# Patient Record
Sex: MALE | Race: WHITE | NOT HISPANIC OR LATINO | Employment: FULL TIME | URBAN - METROPOLITAN AREA
[De-identification: names, ages, dates, MRNs, and addresses within clinical notes are randomized per-mention and may not be internally consistent; named-entity substitution may affect disease eponyms.]

---

## 2023-07-07 ENCOUNTER — OFFICE VISIT (OUTPATIENT)
Dept: PRIMARY CARE | Facility: CLINIC | Age: 58
End: 2023-07-07
Payer: COMMERCIAL

## 2023-07-07 VITALS
OXYGEN SATURATION: 97 % | WEIGHT: 226.8 LBS | DIASTOLIC BLOOD PRESSURE: 92 MMHG | HEART RATE: 104 BPM | SYSTOLIC BLOOD PRESSURE: 140 MMHG | BODY MASS INDEX: 30.72 KG/M2 | HEIGHT: 72 IN | TEMPERATURE: 97.9 F | RESPIRATION RATE: 18 BRPM

## 2023-07-07 DIAGNOSIS — Z00.00 ANNUAL PHYSICAL EXAM: ICD-10-CM

## 2023-07-07 DIAGNOSIS — E55.9 VITAMIN D DEFICIENCY: ICD-10-CM

## 2023-07-07 DIAGNOSIS — R06.09 DOE (DYSPNEA ON EXERTION): Primary | ICD-10-CM

## 2023-07-07 DIAGNOSIS — M75.01 ADHESIVE CAPSULITIS OF RIGHT SHOULDER: ICD-10-CM

## 2023-07-07 DIAGNOSIS — E78.5 HYPERLIPIDEMIA, UNSPECIFIED HYPERLIPIDEMIA TYPE: ICD-10-CM

## 2023-07-07 DIAGNOSIS — R07.2 PRECORDIAL PAIN: ICD-10-CM

## 2023-07-07 DIAGNOSIS — G43.811 OTHER MIGRAINE WITH STATUS MIGRAINOSUS, INTRACTABLE: ICD-10-CM

## 2023-07-07 DIAGNOSIS — R94.31 ABNORMAL EKG: ICD-10-CM

## 2023-07-07 DIAGNOSIS — Z12.5 SCREENING PSA (PROSTATE SPECIFIC ANTIGEN): ICD-10-CM

## 2023-07-07 PROBLEM — M54.9 MID BACK PAIN: Status: ACTIVE | Noted: 2023-07-07

## 2023-07-07 PROBLEM — M19.011 ARTHRITIS OF RIGHT SHOULDER REGION: Status: ACTIVE | Noted: 2023-07-07

## 2023-07-07 PROBLEM — M21.372 LEFT FOOT DROP: Status: ACTIVE | Noted: 2023-07-07

## 2023-07-07 PROCEDURE — 1036F TOBACCO NON-USER: CPT | Performed by: FAMILY MEDICINE

## 2023-07-07 PROCEDURE — 99396 PREV VISIT EST AGE 40-64: CPT | Performed by: FAMILY MEDICINE

## 2023-07-07 RX ORDER — ALBUTEROL SULFATE 90 UG/1
2 AEROSOL, METERED RESPIRATORY (INHALATION) EVERY 4 HOURS PRN
COMMUNITY
Start: 2023-03-31 | End: 2023-07-07 | Stop reason: ALTCHOICE

## 2023-07-07 RX ORDER — ROSUVASTATIN CALCIUM 20 MG/1
20 TABLET, COATED ORAL DAILY
Qty: 90 TABLET | Refills: 3 | Status: SHIPPED | OUTPATIENT
Start: 2023-07-07

## 2023-07-07 RX ORDER — ROSUVASTATIN CALCIUM 20 MG/1
20 TABLET, COATED ORAL DAILY
COMMUNITY
End: 2023-07-07 | Stop reason: SDUPTHER

## 2023-07-07 NOTE — PROGRESS NOTES
Subjective   Patient ID: Portillo Schwartz is a 58 y.o. male who presents for Annual Exam and Migraine.    HPI    Patient presents today for annual exam.  Denies SOB.  Eats unhealthy diet.  Exercises through physical employment.  Does not sleep well.  Denies abdominal pain, black or bloody stools.  BM normal.  Urination normal, no pain.  Last eye exam was 2 years ago.  Last dental exam was before [facility].  No new family h/o cancers or heart disease.    Also presents for migraines.  He states this is the worst one he has ever had.  He has had 2 bad ones in the last 6 months.  This past one he was out on Wednesday.  He used ice on the back of his neck yesterday.  Yesterday he had blurred vision.  He did take Aleve.  He believes he has high blood pressure and wonders if this is a factor.  He did have slight chest pain last night.  Her states the migraine was up the right back of head and behind right eye.  Last eye exam was 2 years ago.    He states that he had frozen right shoulder.  That was back in COVID approximately 2020.  He did get a Cortisone injection and it improved.  He states he is starting to get the pain again.    Reports his knee is improving.    He has been is taking THC gummies sold OTC.  Just wanted PCP aware.  Uses after work and only once daily.  Helps with pain and anxiety.    Review of Systems  Constitutional:  no chills, no fever and no night sweats.  Eyes: no blurred vision and no eyesight problems.  ENT: no hearing loss, no nasal congestion, no hoarseness and no sore throat.  Neck: no mass (es) and no swelling.  Cardiovascular: no chest pain, no intermittent leg claudication, no lower extremity edema, no palpitation and no syncope.  Respiratory: no cough, no shortness of breath during exertion, no shortness of breath at rest and no wheezing.  Gastrointestinal: no abdominal pain, no blood in stools, no constipation, no diarrhea, no melena, no nausea, no rectal pain and no vomiting.  Genitourinary: no  dysuria, no change in urinary frequency, no urinary hesitancy and no feelings of urinary urgency.  Musculoskeletal: no arthralgias, no back pain and no myalgias.  Integumentary: no new skin lesions and no rashes.  Neurological: no difficulty walking, no headache, no limb weakness, no numbness and no tingling.  Psychiatric/Behavioral: no anxiety, no depression, no anhedonia and no substance use disorders.  Endocrine: no recent weight gain and no recent weight loss.  Hematologic/Lymphatic: no tendency for easy bruising and no swollen glands    Objective   Physical Exam  Patient in for annual exam history of occasional migraine nothing really helped that is 3 days worth of severe headache recently.  He was supposed to get a stress test due to chest pain and shortness of breath with chronic tachycardia abnormal EKG never got that scheduled.  We need to get that done.  Headache is mostly resolved still has some pain and tenderness of the base of the skull.  No thyromegaly lungs are clear cardiac and abdominal exams benign physical exam today's office visit constitutional alert and oriented x3.    Head is atraumatic HEENT is within normal limits.    Neck supple no masses full range of motion.    Thyroid is normal in size no thyromegaly there is no carotid bruits.    Pulmonary exam shows clear to auscultation no respiratory distress.    Cardiovascular shows no murmur rub or gallop.  Regular rate and rhythm.    Abdominal exam soft nontender no hepatosplenomegaly or masses normal bowel sounds no rebound no guarding.    Musculoskeletal exam no joint pain no muscle pain full range of motion.    Psych exam normal mood and affect.    Dermatologic exam no skin lesions no rash no blemishes.    Neuro exam is no focal deficits.  Normal exam.    Extremities no edema normal pulses normal capillary refill.    BP (!) 140/92 (BP Location: Left arm, Patient Position: Sitting, BP Cuff Size: Adult)   Pulse 104   Temp 36.6 °C (97.9 °F)  (Temporal)   Resp 18   Ht 1.829 m (6')   Wt 103 kg (226 lb 12.8 oz)   SpO2 97%   BMI 30.76 kg/m²     Lab Results   Component Value Date    WBC 5.1 11/16/2022    HGB 16.0 11/16/2022    HCT 46.9 11/16/2022    MCV 92 11/16/2022     (L) 11/16/2022       Assessment/Plan nuclear stress test due to persistent tachycardia abnormal EKG positive family history dyspnea on exertion and chest discomfort with exertion.  Due for blood work also but does not need that done till November.  Get him approved for the stress test and schedule it as soon as possible he develops persistent pain needs to go to the ER.  Problem List Items Addressed This Visit          Cardiac and Vasculature    Hyperlipidemia       Endocrine/Metabolic    Vitamin D deficiency     Other Visit Diagnoses       Screening PSA (prostate specific antigen)        Annual physical exam        Other migraine with status migrainosus, intractable        Adhesive capsulitis of right shoulder

## 2023-07-10 ENCOUNTER — TELEPHONE (OUTPATIENT)
Dept: PRIMARY CARE | Facility: CLINIC | Age: 58
End: 2023-07-10
Payer: COMMERCIAL

## 2023-07-10 NOTE — TELEPHONE ENCOUNTER
I CALLED GREGORIO BACK  FROM [facility] FROM [facility] [place] . SHE CLARIFIED PATIENTS NUCLEAR STRESS TEST.  SHE STATED IT NEEDS TO BE A PHARMACEUTICAL STRESS TEST OF A EXERCISE STRESS TEST   PLEASE CHANGE ORDER TO REFLECT THIS   03-Oct-2022

## 2023-07-21 ENCOUNTER — OFFICE VISIT (OUTPATIENT)
Dept: PRIMARY CARE | Facility: CLINIC | Age: 58
End: 2023-07-21
Payer: COMMERCIAL

## 2023-07-21 VITALS
DIASTOLIC BLOOD PRESSURE: 100 MMHG | WEIGHT: 224.4 LBS | HEART RATE: 88 BPM | OXYGEN SATURATION: 97 % | TEMPERATURE: 97.1 F | BODY MASS INDEX: 30.4 KG/M2 | HEIGHT: 72 IN | SYSTOLIC BLOOD PRESSURE: 150 MMHG

## 2023-07-21 DIAGNOSIS — I10 PRIMARY HYPERTENSION: Primary | ICD-10-CM

## 2023-07-21 PROCEDURE — 3080F DIAST BP >= 90 MM HG: CPT | Performed by: FAMILY MEDICINE

## 2023-07-21 PROCEDURE — 99213 OFFICE O/P EST LOW 20 MIN: CPT | Performed by: FAMILY MEDICINE

## 2023-07-21 PROCEDURE — 3077F SYST BP >= 140 MM HG: CPT | Performed by: FAMILY MEDICINE

## 2023-07-21 PROCEDURE — 1036F TOBACCO NON-USER: CPT | Performed by: FAMILY MEDICINE

## 2023-07-21 RX ORDER — LISINOPRIL 10 MG/1
10 TABLET ORAL DAILY
Qty: 30 TABLET | Refills: 5 | Status: SHIPPED | OUTPATIENT
Start: 2023-07-21 | End: 2024-01-17

## 2023-07-21 NOTE — PROGRESS NOTES
Subjective   Patient ID: Portillo Schwartz is a 58 y.o. male who presents for High blood pressure .  HPI  Elevated BP  Patient had a stress test today 07/21/2023  Patient BP was 181/113  No increase in migraines   No visual disturbances   No numbness or tingling   No chest pain or SOB            Review of Systems  Constitutional:  no chills, no fever and no night sweats.  Eyes: no blurred vision and no eyesight problems.  ENT: no hearing loss, no nasal congestion, no hoarseness and no sore throat.  Neck: no mass (es) and no swelling.  Cardiovascular: no chest pain, no intermittent leg claudication, no lower extremity edema, no palpitation and no syncope.  Respiratory: no cough, no shortness of breath during exertion, no shortness of breath at rest and no wheezing.  Gastrointestinal: no abdominal pain, no blood in stools, no constipation, no diarrhea, no melena, no nausea, no rectal pain and no vomiting.  Genitourinary: no dysuria, no change in urinary frequency, no urinary hesitancy and no feelings of urinary urgency.  Musculoskeletal: no arthralgias, no back pain and no myalgias.  Integumentary: no new skin lesions and no rashes.  Neurological: no difficulty walking, no headache, no limb weakness, no numbness and no tingling.  Psychiatric/Behavioral: no anxiety, no depression, no anhedonia and no substance use disorders.  Endocrine: no recent weight gain and no recent weight loss.  Hematologic/Lymphatic: no tendency for easy bruising and no swollen glands    Objective   Physical Exam  Patient here for follow-up PET stress test nuclear exam done today blood pressure is very high has been running high was 180/100 they went through the stress test but did not want him to get on the treadmill.  He is feeling better now but his blood pressure still elevated.  We have discussed possibility of being placed on medication at the prior visits he is 1 to do that.  Lungs are clear cardiac and abdominal exams are benign.  No  peripheral edema.  BP (!) 150/100   Pulse 88   Temp 36.2 °C (97.1 °F) (Temporal)   Ht 1.829 m (6')   Wt 102 kg (224 lb 6.4 oz)   SpO2 97%   BMI 30.43 kg/m²     Lab Results   Component Value Date    WBC 5.1 11/16/2022    HGB 16.0 11/16/2022    HCT 46.9 11/16/2022    MCV 92 11/16/2022     (L) 11/16/2022       Assessment/Plan assessment hypertension plan is to start him on 10 mg of lisinopril follow-up in 3 weeks.  Recommend to get a home blood pressure monitor bring that with him along with his BP twice a day readings in 3 weeks.  If he has any other problems tolerating the new medication he will let us know.  He is aware it can occasionally cause persistent dry cough.  Problem List Items Addressed This Visit    None

## 2023-07-24 ENCOUNTER — TELEPHONE (OUTPATIENT)
Dept: PRIMARY CARE | Facility: CLINIC | Age: 58
End: 2023-07-24
Payer: COMMERCIAL

## 2023-07-27 ENCOUNTER — TELEPHONE (OUTPATIENT)
Dept: PRIMARY CARE | Facility: CLINIC | Age: 58
End: 2023-07-27
Payer: COMMERCIAL

## 2023-09-21 ENCOUNTER — OFFICE VISIT (OUTPATIENT)
Dept: PRIMARY CARE | Facility: CLINIC | Age: 58
End: 2023-09-21
Payer: COMMERCIAL

## 2023-09-21 VITALS
HEART RATE: 112 BPM | OXYGEN SATURATION: 96 % | DIASTOLIC BLOOD PRESSURE: 80 MMHG | BODY MASS INDEX: 29.84 KG/M2 | WEIGHT: 220 LBS | RESPIRATION RATE: 20 BRPM | SYSTOLIC BLOOD PRESSURE: 138 MMHG

## 2023-09-21 DIAGNOSIS — M25.561 ACUTE PAIN OF RIGHT KNEE: Primary | ICD-10-CM

## 2023-09-21 PROCEDURE — 99213 OFFICE O/P EST LOW 20 MIN: CPT | Performed by: FAMILY MEDICINE

## 2023-09-21 PROCEDURE — 1036F TOBACCO NON-USER: CPT | Performed by: FAMILY MEDICINE

## 2023-09-21 RX ORDER — HYDROCODONE BITARTRATE AND ACETAMINOPHEN 5; 325 MG/1; MG/1
1 TABLET ORAL EVERY 8 HOURS PRN
Qty: 21 TABLET | Refills: 0 | Status: SHIPPED | OUTPATIENT
Start: 2023-09-21 | End: 2023-09-28

## 2023-09-21 RX ORDER — PREDNISONE 10 MG/1
TABLET ORAL
Qty: 51 TABLET | Refills: 0 | Status: SHIPPED | OUTPATIENT
Start: 2023-09-21

## 2023-09-21 NOTE — PROGRESS NOTES
"Subjective   Patient ID: Portillo Schwartz is a 58 y.o. male who presents for Knee Pain and Results.    HPI  Here for above reasons    Pt having pain in RIGHT knee  Ongoing x5 days ago  Cause: was moving water heater upstairs and felt \"snap\" like a rubber band behind and on side of knee  Swelling around knee  Pt has to walk with leg sideways  Describes as: constant and burning  Rates pain 7 /10  Pt has tried ice and elevation    Pt would also like to discuss nuclear test done 7/21 and Cardiac stress test done on 7/31    Review of Systems  Constitutional:  no chills, no fever and no night sweats.  Eyes: no blurred vision and no eyesight problems.  ENT: no hearing loss, no nasal congestion, no hoarseness and no sore throat.  Neck: no mass (es) and no swelling.  Cardiovascular: no chest pain, no intermittent leg claudication, no lower extremity edema, no palpitation and no syncope.  Respiratory: no cough, no shortness of breath during exertion, no shortness of breath at rest and no wheezing.  Gastrointestinal: no abdominal pain, no blood in stools, no constipation, no diarrhea, no melena, no nausea, no rectal pain and no vomiting.  Genitourinary: no dysuria, no change in urinary frequency, no urinary hesitancy and no feelings of urinary urgency.  Musculoskeletal: no arthralgias, no back pain and no myalgias.  Integumentary: no new skin lesions and no rashes.  Neurological: no difficulty walking, no headache, no limb weakness, no numbness and no tingling.  Psychiatric/Behavioral: no anxiety, no depression, no anhedonia and no substance use disorders.  Endocrine: no recent weight gain and no recent weight loss.  Hematologic/Lymphatic: no tendency for easy bruising and no swollen glands    Objective   Physical Exam  Patient in with right knee pain was taking on a water heater out of his basement up steps felt a pop in his right knee some swelling pain posteriorly along the hamstring insertion there is no evidence of " hamstring tear no bruising there is some mild effusion laterally pain over the MCL.  No obvious weakness or tear.  He is limping he has to throw his leg out angle it with the toes placed laterally to minimize the discomfort.   /80   Pulse (!) 112   Resp 20   Wt 99.8 kg (220 lb)   SpO2 96%   BMI 29.84 kg/m²     Lab Results   Component Value Date    WBC 5.1 11/16/2022    HGB 16.0 11/16/2022    HCT 46.9 11/16/2022    MCV 92 11/16/2022     (L) 11/16/2022       Assessment/Plan plan burst and taper prednisone follow-up in 2 weeks may need MRI.  Patient is unable to tolerate physical therapy due to extreme pain at this point time.  Problem List Items Addressed This Visit    None

## 2024-01-08 NOTE — PROGRESS NOTES
Subjective   Patient ID: Portillo Schwartz is a 58 y.o. male who presents for Cough.  HPI  Patient presents today complaining of a cough, and hoarse voice x 4 days. This is gradually improving. Denies fever, chills, loss of taste/smell. At-home COVID test yesterday was negative. No known exposure to COVID. Has tried nothing OTC.      Has no other new problem /question.    Review of Systems  Constitutional:  no chills, no fever and no night sweats.  Eyes: no blurred vision and no eyesight problems.  ENT: no hearing loss, no nasal congestion, no hoarseness and no sore throat.  Neck: no mass (es) and no swelling.  Cardiovascular: no chest pain, no intermittent leg claudication, no lower extremity edema, no palpitation and no syncope.  Respiratory: no cough, no shortness of breath during exertion, no shortness of breath at rest and no wheezing.  Gastrointestinal: no abdominal pain, no blood in stools, no constipation, no diarrhea, no melena, no nausea, no rectal pain and no vomiting.  Genitourinary: no dysuria, no change in urinary frequency, no urinary hesitancy and no feelings of urinary urgency.  Musculoskeletal: no arthralgias, no back pain and no myalgias.  Integumentary: no new skin lesions and no rashes.  Neurological: no difficulty walking, no headache, no limb weakness, no numbness and no tingling.  Psychiatric/Behavioral: no anxiety, no depression, no anhedonia and no substance use disorders.  Endocrine: no recent weight gain and no recent weight loss.  Hematologic/Lymphatic: no tendency for easy bruising and no swollen glands    Objective   Physical Exam  Patient with sinus pressure congestion productive cough also chest congestion fever body aches negative COVID test exam frontal and maxillary sinus discomfort lungs show good air entry with some scattered rhonchi no wheeze crackles or retractions no respiratory distress cardiac and abdominal exams are benign.  /86   Pulse (!) 121   Temp 36.8 °C (98.3  °F)   Resp 16   Ht 1.829 m (6')   Wt 102 kg (225 lb 12.8 oz)   SpO2 98%   BMI 30.62 kg/m²     Lab Results   Component Value Date    WBC 5.1 11/16/2022    HGB 16.0 11/16/2022    HCT 46.9 11/16/2022    MCV 92 11/16/2022     (L) 11/16/2022       Assessment/Plan assessment sinusitis pharyngitis plan Z-Giorgio and follow-up if not improving  Problem List Items Addressed This Visit    None  Visit Diagnoses       Acute cough    -  Primary    Hoarse        Acute non-recurrent maxillary sinusitis        Relevant Medications    azithromycin (Zithromax) 250 mg tablet

## 2024-01-09 ENCOUNTER — OFFICE VISIT (OUTPATIENT)
Dept: PRIMARY CARE | Facility: CLINIC | Age: 59
End: 2024-01-09
Payer: COMMERCIAL

## 2024-01-09 VITALS
OXYGEN SATURATION: 98 % | DIASTOLIC BLOOD PRESSURE: 86 MMHG | RESPIRATION RATE: 16 BRPM | TEMPERATURE: 98.3 F | HEIGHT: 72 IN | SYSTOLIC BLOOD PRESSURE: 132 MMHG | WEIGHT: 225.8 LBS | HEART RATE: 121 BPM | BODY MASS INDEX: 30.58 KG/M2

## 2024-01-09 DIAGNOSIS — R49.0 HOARSE: ICD-10-CM

## 2024-01-09 DIAGNOSIS — J01.00 ACUTE NON-RECURRENT MAXILLARY SINUSITIS: ICD-10-CM

## 2024-01-09 DIAGNOSIS — R05.1 ACUTE COUGH: Primary | ICD-10-CM

## 2024-01-09 PROCEDURE — 99213 OFFICE O/P EST LOW 20 MIN: CPT | Performed by: FAMILY MEDICINE

## 2024-01-09 PROCEDURE — 1036F TOBACCO NON-USER: CPT | Performed by: FAMILY MEDICINE

## 2024-01-09 RX ORDER — AZITHROMYCIN 250 MG/1
TABLET, FILM COATED ORAL
Qty: 6 TABLET | Refills: 0 | Status: SHIPPED | OUTPATIENT
Start: 2024-01-09 | End: 2024-01-13

## 2024-01-09 ASSESSMENT — PATIENT HEALTH QUESTIONNAIRE - PHQ9
2. FEELING DOWN, DEPRESSED OR HOPELESS: NOT AT ALL
1. LITTLE INTEREST OR PLEASURE IN DOING THINGS: NOT AT ALL
SUM OF ALL RESPONSES TO PHQ9 QUESTIONS 1 AND 2: 0

## 2024-06-26 NOTE — PROGRESS NOTES
Subjective   Patient ID: Portillo Schwartz is a 59 y.o. male who presents for No chief complaint on file..  HPI    Patient presents in the office with complaints of a left heel wound. Patient states he was in the yard doing yard work and rolled his ankle. Patient went to take off his sock and it was bloody. Patient states it was swollen and he wrapped it. Patient was seen at [facility] and they prescribed and antibiotic and an ointment. Urgent care did xrays.  Patient states the antibiotic did help.  Denies stepping on anything that he knows of. Patient states he has green discharge coming from the wound but no odor. Ongoing X 3 weeks. Has tried using an ace wrap.     Review of Systems  Constitutional:  no chills, no fever and no night sweats.  Eyes: no blurred vision and no eyesight problems.  ENT: no hearing loss, no nasal congestion, no hoarseness and no sore throat.  Neck: no mass (es) and no swelling.  Cardiovascular: no chest pain, no intermittent leg claudication, no lower extremity edema, no palpitation and no syncope.  Respiratory: no cough, no shortness of breath during exertion, no shortness of breath at rest and no wheezing.  Gastrointestinal: no abdominal pain, no blood in stools, no constipation, no diarrhea, no melena, no nausea, no rectal pain and no vomiting.  Genitourinary: no dysuria, no change in urinary frequency, no urinary hesitancy and no feelings of urinary urgency.  Musculoskeletal: no arthralgias, no back pain and no myalgias.  Integumentary: no new skin lesions and no rashes.  Neurological: no difficulty walking, no headache, no limb weakness, no numbness and no tingling.  Psychiatric/Behavioral: no anxiety, no depression, no anhedonia and no substance use disorders.  Endocrine: no recent weight gain and no recent weight loss.  Hematologic/Lymphatic: no tendency for easy bruising and no swollen glands    Objective   Physical Exam  Patient with history of left heel wound had some  swelling in his foot and ankle after twisting it in the backyard went to work his boot barely went on he has a history of neuropathy in the lower extremity on the left side did not feel any pain got home that night took his sock off and there was blood needed rubbed the area just the lateral portion of the outer area of the heel raw.  Exam today is she is early granulation tissue forming there is still some devitalized tissue in the center's this was trimmed it did not hurt him at all.  No active bleeding it is erythematous nontender may mild cellulitis he had been on antibiotic and seem to be doing better at that point advised to stop using peroxide on it see if we can get it to dry out crust over keep it clean and dry just use soap and water when he does have to cleanse it we will put him on Cipro as a precaution.  He is due for blood work also needs medication refills  There were no vitals taken for this visit.    Lab Results   Component Value Date    WBC 5.1 11/16/2022    HGB 16.0 11/16/2022    HCT 46.9 11/16/2022    MCV 92 11/16/2022     (L) 11/16/2022       Assessment/Plan plan routine recheck in 1 week sooner if seems to be worsening.  Problem List Items Addressed This Visit    None

## 2024-06-27 ENCOUNTER — APPOINTMENT (OUTPATIENT)
Dept: PRIMARY CARE | Facility: CLINIC | Age: 59
End: 2024-06-27
Payer: COMMERCIAL

## 2024-06-27 VITALS
HEART RATE: 103 BPM | TEMPERATURE: 97.7 F | BODY MASS INDEX: 30.88 KG/M2 | DIASTOLIC BLOOD PRESSURE: 82 MMHG | SYSTOLIC BLOOD PRESSURE: 136 MMHG | OXYGEN SATURATION: 97 % | WEIGHT: 228 LBS | HEIGHT: 72 IN

## 2024-06-27 DIAGNOSIS — E78.5 HYPERLIPIDEMIA, UNSPECIFIED HYPERLIPIDEMIA TYPE: ICD-10-CM

## 2024-06-27 DIAGNOSIS — S91.301S OPEN WOUND OF RIGHT HEEL, SEQUELA: Primary | ICD-10-CM

## 2024-06-27 DIAGNOSIS — I10 PRIMARY HYPERTENSION: ICD-10-CM

## 2024-06-27 DIAGNOSIS — E66.09 CLASS 1 OBESITY DUE TO EXCESS CALORIES WITH SERIOUS COMORBIDITY AND BODY MASS INDEX (BMI) OF 30.0 TO 30.9 IN ADULT: ICD-10-CM

## 2024-06-27 DIAGNOSIS — N40.0 BENIGN PROSTATIC HYPERPLASIA, UNSPECIFIED WHETHER LOWER URINARY TRACT SYMPTOMS PRESENT: ICD-10-CM

## 2024-06-27 PROCEDURE — 3079F DIAST BP 80-89 MM HG: CPT | Performed by: FAMILY MEDICINE

## 2024-06-27 PROCEDURE — 3075F SYST BP GE 130 - 139MM HG: CPT | Performed by: FAMILY MEDICINE

## 2024-06-27 PROCEDURE — 99213 OFFICE O/P EST LOW 20 MIN: CPT | Performed by: FAMILY MEDICINE

## 2024-06-27 PROCEDURE — 1036F TOBACCO NON-USER: CPT | Performed by: FAMILY MEDICINE

## 2024-06-27 PROCEDURE — 3008F BODY MASS INDEX DOCD: CPT | Performed by: FAMILY MEDICINE

## 2024-06-27 RX ORDER — ROSUVASTATIN CALCIUM 20 MG/1
20 TABLET, COATED ORAL DAILY
Qty: 90 TABLET | Refills: 1 | Status: SHIPPED | OUTPATIENT
Start: 2024-06-27

## 2024-06-27 RX ORDER — LISINOPRIL 10 MG/1
10 TABLET ORAL DAILY
Qty: 90 TABLET | Refills: 1 | Status: SHIPPED | OUTPATIENT
Start: 2024-06-27 | End: 2024-09-25

## 2024-06-27 RX ORDER — CIPROFLOXACIN 500 MG/1
500 TABLET ORAL 2 TIMES DAILY
Qty: 20 TABLET | Refills: 1 | Status: SHIPPED | OUTPATIENT
Start: 2024-06-27 | End: 2024-07-07

## 2024-06-27 ASSESSMENT — PATIENT HEALTH QUESTIONNAIRE - PHQ9
1. LITTLE INTEREST OR PLEASURE IN DOING THINGS: NOT AT ALL
2. FEELING DOWN, DEPRESSED OR HOPELESS: NOT AT ALL
SUM OF ALL RESPONSES TO PHQ9 QUESTIONS 1 AND 2: 0

## 2024-06-29 ENCOUNTER — LAB (OUTPATIENT)
Dept: LAB | Facility: LAB | Age: 59
End: 2024-06-29
Payer: COMMERCIAL

## 2024-06-29 DIAGNOSIS — N40.0 BENIGN PROSTATIC HYPERPLASIA, UNSPECIFIED WHETHER LOWER URINARY TRACT SYMPTOMS PRESENT: ICD-10-CM

## 2024-06-29 DIAGNOSIS — E55.9 VITAMIN D DEFICIENCY: ICD-10-CM

## 2024-06-29 DIAGNOSIS — E78.5 HYPERLIPIDEMIA, UNSPECIFIED HYPERLIPIDEMIA TYPE: ICD-10-CM

## 2024-06-29 DIAGNOSIS — I10 PRIMARY HYPERTENSION: ICD-10-CM

## 2024-06-29 LAB
25(OH)D3 SERPL-MCNC: 69 NG/ML (ref 30–100)
ALBUMIN SERPL BCP-MCNC: 4.4 G/DL (ref 3.4–5)
ALP SERPL-CCNC: 50 U/L (ref 33–120)
ALT SERPL W P-5'-P-CCNC: 49 U/L (ref 10–52)
ANION GAP SERPL CALC-SCNC: 9 MMOL/L (ref 10–20)
AST SERPL W P-5'-P-CCNC: 42 U/L (ref 9–39)
BASOPHILS # BLD AUTO: 0.05 X10*3/UL (ref 0–0.1)
BASOPHILS NFR BLD AUTO: 0.9 %
BILIRUB SERPL-MCNC: 0.6 MG/DL (ref 0–1.2)
BUN SERPL-MCNC: 21 MG/DL (ref 6–23)
CALCIUM SERPL-MCNC: 9.2 MG/DL (ref 8.6–10.3)
CHLORIDE SERPL-SCNC: 108 MMOL/L (ref 98–107)
CHOLEST SERPL-MCNC: 179 MG/DL (ref 0–199)
CHOLESTEROL/HDL RATIO: 2.7
CO2 SERPL-SCNC: 28 MMOL/L (ref 21–32)
CREAT SERPL-MCNC: 0.98 MG/DL (ref 0.5–1.3)
EGFRCR SERPLBLD CKD-EPI 2021: 89 ML/MIN/1.73M*2
EOSINOPHIL # BLD AUTO: 0.33 X10*3/UL (ref 0–0.7)
EOSINOPHIL NFR BLD AUTO: 6 %
ERYTHROCYTE [DISTWIDTH] IN BLOOD BY AUTOMATED COUNT: 11.9 % (ref 11.5–14.5)
GLUCOSE SERPL-MCNC: 83 MG/DL (ref 74–99)
HCT VFR BLD AUTO: 45.9 % (ref 41–52)
HDLC SERPL-MCNC: 65.3 MG/DL
HGB BLD-MCNC: 15.7 G/DL (ref 13.5–17.5)
IMM GRANULOCYTES # BLD AUTO: 0.01 X10*3/UL (ref 0–0.7)
IMM GRANULOCYTES NFR BLD AUTO: 0.2 % (ref 0–0.9)
LDLC SERPL CALC-MCNC: 78 MG/DL
LYMPHOCYTES # BLD AUTO: 1.5 X10*3/UL (ref 1.2–4.8)
LYMPHOCYTES NFR BLD AUTO: 27.1 %
MCH RBC QN AUTO: 31.1 PG (ref 26–34)
MCHC RBC AUTO-ENTMCNC: 34.2 G/DL (ref 32–36)
MCV RBC AUTO: 91 FL (ref 80–100)
MONOCYTES # BLD AUTO: 0.51 X10*3/UL (ref 0.1–1)
MONOCYTES NFR BLD AUTO: 9.2 %
NEUTROPHILS # BLD AUTO: 3.13 X10*3/UL (ref 1.2–7.7)
NEUTROPHILS NFR BLD AUTO: 56.6 %
NON HDL CHOLESTEROL: 114 MG/DL (ref 0–149)
NRBC BLD-RTO: 0 /100 WBCS (ref 0–0)
PLATELET # BLD AUTO: 145 X10*3/UL (ref 150–450)
POTASSIUM SERPL-SCNC: 4.9 MMOL/L (ref 3.5–5.3)
PROT SERPL-MCNC: 6.4 G/DL (ref 6.4–8.2)
PSA SERPL-MCNC: 2.65 NG/ML
RBC # BLD AUTO: 5.05 X10*6/UL (ref 4.5–5.9)
SODIUM SERPL-SCNC: 140 MMOL/L (ref 136–145)
TRIGL SERPL-MCNC: 181 MG/DL (ref 0–149)
VLDL: 36 MG/DL (ref 0–40)
WBC # BLD AUTO: 5.5 X10*3/UL (ref 4.4–11.3)

## 2024-06-29 PROCEDURE — 85025 COMPLETE CBC W/AUTO DIFF WBC: CPT

## 2024-06-29 PROCEDURE — 80061 LIPID PANEL: CPT

## 2024-06-29 PROCEDURE — 82306 VITAMIN D 25 HYDROXY: CPT

## 2024-06-29 PROCEDURE — 36415 COLL VENOUS BLD VENIPUNCTURE: CPT

## 2024-06-29 PROCEDURE — 80053 COMPREHEN METABOLIC PANEL: CPT

## 2024-06-29 PROCEDURE — 84153 ASSAY OF PSA TOTAL: CPT

## 2024-07-01 ENCOUNTER — TELEPHONE (OUTPATIENT)
Dept: PRIMARY CARE | Facility: CLINIC | Age: 59
End: 2024-07-01
Payer: COMMERCIAL

## 2024-07-01 NOTE — TELEPHONE ENCOUNTER
----- Message from Luis Unger MD sent at 7/1/2024  8:11 AM EDT -----  Labs stable.  Will discuss further at at his appointment July 5.

## 2024-07-03 NOTE — PROGRESS NOTES
Subjective   Patient ID: Portillo Schwartz is a 59 y.o. male who presents for Wound Check and Annual Exam.  HPI    Patient presents in the office today for an annual exam.     Patient presents in the office today for a follow up on a heel wound. Patient was seen in the office on 6/27/24 and was prescribed Cipro. Patient states it looks better and the wound looks like its getting smaller. Patient states he does not think this wound is going to heal until he off his feet. Patient states he is on his feet for 12 hour days.     Review of Systems  Constitutional:  no chills, no fever and no night sweats.  Eyes: no blurred vision and no eyesight problems.  ENT: no hearing loss, no nasal congestion, no hoarseness and no sore throat.  Neck: no mass (es) and no swelling.  Cardiovascular: no chest pain, no intermittent leg claudication, no lower extremity edema, no palpitation and no syncope.  Respiratory: no cough, no shortness of breath during exertion, no shortness of breath at rest and no wheezing.  Gastrointestinal: no abdominal pain, no blood in stools, no constipation, no diarrhea, no melena, no nausea, no rectal pain and no vomiting.  Genitourinary: no dysuria, no change in urinary frequency, no urinary hesitancy and no feelings of urinary urgency.  Musculoskeletal: no arthralgias, no back pain and no myalgias.  Integumentary: no new skin lesions and no rashes.  Neurological: no difficulty walking, no headache, no limb weakness, no numbness and no tingling.  Psychiatric/Behavioral: no anxiety, no depression, no anhedonia and no substance use disorders.  Endocrine: no recent weight gain and no recent weight loss.  Hematologic/Lymphatic: no tendency for easy bruising and no swollen glands    Objective   Physical Exam  Wound on the heel is better no evidence of infection still raw needs to stay off his feet he is going to tell work that he will take the next week off he needs a note from us explaining why we will give him  that today.  Plan finish antibiotic keep it open to air is much as possible stay off his feet keep her feet elevated and we will follow-up with him in a week.  /80 (BP Location: Left arm, Patient Position: Sitting)   Pulse 101   Temp 36.2 °C (97.1 °F) (Temporal)   Ht 1.829 m (6')   Wt 102 kg (224 lb 12.8 oz)   SpO2 97%   BMI 30.49 kg/m²     Lab Results   Component Value Date    WBC 5.5 06/29/2024    HGB 15.7 06/29/2024    HCT 45.9 06/29/2024    MCV 91 06/29/2024     (L) 06/29/2024       Assessment/Plan   Problem List Items Addressed This Visit    None  Visit Diagnoses       Annual physical exam    -  Primary    Open wound of right heel, sequela        BMI 30.0-30.9,adult        Class 1 obesity due to excess calories with serious comorbidity and body mass index (BMI) of 30.0 to 30.9 in adult

## 2024-07-05 ENCOUNTER — APPOINTMENT (OUTPATIENT)
Dept: PRIMARY CARE | Facility: CLINIC | Age: 59
End: 2024-07-05
Payer: COMMERCIAL

## 2024-07-05 ENCOUNTER — TELEPHONE (OUTPATIENT)
Dept: PRIMARY CARE | Facility: CLINIC | Age: 59
End: 2024-07-05

## 2024-07-05 VITALS
OXYGEN SATURATION: 97 % | SYSTOLIC BLOOD PRESSURE: 130 MMHG | HEIGHT: 72 IN | WEIGHT: 224.8 LBS | BODY MASS INDEX: 30.45 KG/M2 | TEMPERATURE: 97.1 F | DIASTOLIC BLOOD PRESSURE: 80 MMHG | HEART RATE: 101 BPM

## 2024-07-05 DIAGNOSIS — Z00.00 ANNUAL PHYSICAL EXAM: Primary | ICD-10-CM

## 2024-07-05 DIAGNOSIS — E66.09 CLASS 1 OBESITY DUE TO EXCESS CALORIES WITH SERIOUS COMORBIDITY AND BODY MASS INDEX (BMI) OF 30.0 TO 30.9 IN ADULT: ICD-10-CM

## 2024-07-05 DIAGNOSIS — S91.301S OPEN WOUND OF RIGHT HEEL, SEQUELA: ICD-10-CM

## 2024-07-05 PROCEDURE — 1036F TOBACCO NON-USER: CPT | Performed by: FAMILY MEDICINE

## 2024-07-05 PROCEDURE — 99213 OFFICE O/P EST LOW 20 MIN: CPT | Performed by: FAMILY MEDICINE

## 2024-07-05 PROCEDURE — 3008F BODY MASS INDEX DOCD: CPT | Performed by: FAMILY MEDICINE

## 2024-07-05 NOTE — LETTER
July 5, 2024     Patient: Portillo Schwartz   YOB: 1965   Date of Visit: 7/5/2024       To Whom It May Concern:    Portillo Schwartz was seen in my clinic on 7/5/2024 at ?. Please excuse Portillo for his absence from work on this day to make the appointment. Also, please excuse my patient's absence from work on Tuesday July 9th through Monday July 15th.     If you have any questions or concerns, please don't hesitate to call.         Sincerely,         Luis Unger MD

## 2024-07-12 ENCOUNTER — APPOINTMENT (OUTPATIENT)
Dept: PRIMARY CARE | Facility: CLINIC | Age: 59
End: 2024-07-12
Payer: COMMERCIAL

## 2024-07-12 VITALS
WEIGHT: 224.4 LBS | SYSTOLIC BLOOD PRESSURE: 134 MMHG | HEIGHT: 72 IN | HEART RATE: 101 BPM | OXYGEN SATURATION: 96 % | TEMPERATURE: 96.8 F | DIASTOLIC BLOOD PRESSURE: 78 MMHG | BODY MASS INDEX: 30.4 KG/M2

## 2024-07-12 DIAGNOSIS — S91.301S OPEN WOUND OF RIGHT HEEL, SEQUELA: ICD-10-CM

## 2024-07-12 DIAGNOSIS — S91.302S OPEN WOUND OF LEFT HEEL, SEQUELA: Primary | ICD-10-CM

## 2024-07-12 PROCEDURE — 1036F TOBACCO NON-USER: CPT | Performed by: FAMILY MEDICINE

## 2024-07-12 PROCEDURE — 99213 OFFICE O/P EST LOW 20 MIN: CPT | Performed by: FAMILY MEDICINE

## 2024-07-12 PROCEDURE — 3008F BODY MASS INDEX DOCD: CPT | Performed by: FAMILY MEDICINE

## 2024-07-12 RX ORDER — CIPROFLOXACIN 500 MG/1
500 TABLET ORAL 2 TIMES DAILY
Qty: 20 TABLET | Refills: 1 | Status: SHIPPED | OUTPATIENT
Start: 2024-07-12 | End: 2024-07-22

## 2024-07-12 ASSESSMENT — PATIENT HEALTH QUESTIONNAIRE - PHQ9
SUM OF ALL RESPONSES TO PHQ9 QUESTIONS 1 AND 2: 0
2. FEELING DOWN, DEPRESSED OR HOPELESS: NOT AT ALL
1. LITTLE INTEREST OR PLEASURE IN DOING THINGS: NOT AT ALL

## 2024-07-12 NOTE — PROGRESS NOTES
Subjective   Patient ID: Portillo Schwartz is a 59 y.o. male who presents for Wound Check.  HPI    Patient presents in the office today for a follow up on a wound on the left heel. Patient was seen on 7/5/24 and was given a note that he needed to be off his feet and to keep his feet elevated. Patient states he was compliant on doing this. Patient was to finish the antibiotic and keep this wound open to air. Patient states since this visit he was doing well but woke up this morning and the wound had drainage again. Patient states this is getting better though and decreasing in size.     Review of Systems  Constitutional:  no chills, no fever and no night sweats.  Eyes: no blurred vision and no eyesight problems.  ENT: no hearing loss, no nasal congestion, no hoarseness and no sore throat.  Neck: no mass (es) and no swelling.  Cardiovascular: no chest pain, no intermittent leg claudication, no lower extremity edema, no palpitation and no syncope.  Respiratory: no cough, no shortness of breath during exertion, no shortness of breath at rest and no wheezing.  Gastrointestinal: no abdominal pain, no blood in stools, no constipation, no diarrhea, no melena, no nausea, no rectal pain and no vomiting.  Genitourinary: no dysuria, no change in urinary frequency, no urinary hesitancy and no feelings of urinary urgency.  Musculoskeletal: no arthralgias, no back pain and no myalgias.  Integumentary: no new skin lesions and no rashes.  Neurological: no difficulty walking, no headache, no limb weakness, no numbness and no tingling.  Psychiatric/Behavioral: no anxiety, no depression, no anhedonia and no substance use disorders.  Endocrine: no recent weight gain and no recent weight loss.  Hematologic/Lymphatic: no tendency for easy bruising and no swollen glands    Objective   Physical Exam  Patient in for follow-up of heel ulcer out of the antibiotics a few days ago with little bit more drainage since nontender due to the chronic  peripheral neuropathy.  Some mucopurulent discharge no warmth or erythema but the drainage is picked up since she is out of the antibiotics.  Plan is to keep it clean and dry we will restart some antibiotic if this does not start to dry up and heal may need to get him into wound care clinic he will follow-up with us next week.  /78 (BP Location: Left arm, Patient Position: Sitting)   Pulse 101   Temp 36 °C (96.8 °F) (Temporal)   Ht 1.829 m (6')   Wt 102 kg (224 lb 6.4 oz)   SpO2 96%   BMI 30.43 kg/m²     Lab Results   Component Value Date    WBC 5.5 06/29/2024    HGB 15.7 06/29/2024    HCT 45.9 06/29/2024    MCV 91 06/29/2024     (L) 06/29/2024       Assessment/Plan   Problem List Items Addressed This Visit    None  Visit Diagnoses       Open wound of left heel, sequela    -  Primary    Open wound of right heel, sequela        Relevant Medications    ciprofloxacin (Cipro) 500 mg tablet

## 2024-07-17 ENCOUNTER — TELEPHONE (OUTPATIENT)
Dept: PRIMARY CARE | Facility: CLINIC | Age: 59
End: 2024-07-17
Payer: COMMERCIAL

## 2024-07-17 DIAGNOSIS — S91.302S OPEN WOUND OF LEFT HEEL, SEQUELA: Primary | ICD-10-CM

## 2024-07-17 DIAGNOSIS — S91.301S OPEN WOUND OF RIGHT HEEL, SEQUELA: ICD-10-CM

## 2024-07-17 NOTE — TELEPHONE ENCOUNTER
Luis Unger MD   to [id number] [employer]       7/17/24  5:38 PM  Refer him to the wound care clinic

## 2024-07-17 NOTE — TELEPHONE ENCOUNTER
PATIENT IS CALLING - SAW YOU ON 7/12/24 FOR WOUND ON LEFT HEEL - IT IS GETTING NO BETTER, BUT NO WORSE - YOU WANTED HIM TO CALL IF IT DIDN'T GET ANY BETTER AND WOULD POSSIBLY REFER TO WOUND SPECIALIST?  PLEASE ADVISE.

## 2024-07-18 NOTE — TELEPHONE ENCOUNTER
PATIENT RETURNED CALL - REFERRAL WAS PLACED - HE WAS GIVEN THE NUMBER TO [place] AND [facility] WOUND CARE LOCATIONS - HE WILL CALL AND SCHEDULE DUE TO HIS SCHEDULE

## 2024-07-22 ENCOUNTER — LAB REQUISITION (OUTPATIENT)
Dept: LAB | Facility: HOSPITAL | Age: 59
End: 2024-07-22
Payer: COMMERCIAL

## 2024-07-22 ENCOUNTER — OFFICE VISIT (OUTPATIENT)
Dept: WOUND CARE | Facility: CLINIC | Age: 59
End: 2024-07-22
Payer: COMMERCIAL

## 2024-07-22 DIAGNOSIS — L97.422 NON-PRESSURE CHRONIC ULCER OF LEFT HEEL AND MIDFOOT WITH FAT LAYER EXPOSED (MULTI): ICD-10-CM

## 2024-07-22 PROCEDURE — 99213 OFFICE O/P EST LOW 20 MIN: CPT

## 2024-07-22 PROCEDURE — 11042 DBRDMT SUBQ TIS 1ST 20SQCM/<: CPT

## 2024-07-22 PROCEDURE — 87205 SMEAR GRAM STAIN: CPT | Mod: OUT,ELYLAB | Performed by: PLASTIC SURGERY

## 2024-07-22 PROCEDURE — 11042 DBRDMT SUBQ TIS 1ST 20SQCM/<: CPT | Performed by: PLASTIC SURGERY

## 2024-07-22 PROCEDURE — 99213 OFFICE O/P EST LOW 20 MIN: CPT | Performed by: PLASTIC SURGERY

## 2024-07-25 LAB
BACTERIA SPEC CULT: NORMAL
GRAM STN SPEC: NORMAL
GRAM STN SPEC: NORMAL

## 2024-07-29 ENCOUNTER — APPOINTMENT (OUTPATIENT)
Dept: WOUND CARE | Facility: CLINIC | Age: 59
End: 2024-07-29
Payer: COMMERCIAL

## 2024-09-10 ENCOUNTER — HOSPITAL ENCOUNTER (EMERGENCY)
Facility: HOSPITAL | Age: 59
Discharge: HOME | End: 2024-09-10
Attending: EMERGENCY MEDICINE
Payer: COMMERCIAL

## 2024-09-10 VITALS
OXYGEN SATURATION: 97 % | HEART RATE: 93 BPM | HEIGHT: 72 IN | SYSTOLIC BLOOD PRESSURE: 159 MMHG | BODY MASS INDEX: 30.48 KG/M2 | DIASTOLIC BLOOD PRESSURE: 106 MMHG | WEIGHT: 225 LBS | TEMPERATURE: 98.7 F | RESPIRATION RATE: 17 BRPM

## 2024-09-10 DIAGNOSIS — S61.412A LACERATION OF LEFT HAND WITHOUT FOREIGN BODY, INITIAL ENCOUNTER: Primary | ICD-10-CM

## 2024-09-10 PROCEDURE — 90471 IMMUNIZATION ADMIN: CPT

## 2024-09-10 PROCEDURE — 90715 TDAP VACCINE 7 YRS/> IM: CPT

## 2024-09-10 PROCEDURE — 2500000005 HC RX 250 GENERAL PHARMACY W/O HCPCS

## 2024-09-10 PROCEDURE — 2500000004 HC RX 250 GENERAL PHARMACY W/ HCPCS (ALT 636 FOR OP/ED)

## 2024-09-10 PROCEDURE — 12044 INTMD RPR N-HF/GENIT7.6-12.5: CPT

## 2024-09-10 PROCEDURE — 2500000001 HC RX 250 WO HCPCS SELF ADMINISTERED DRUGS (ALT 637 FOR MEDICARE OP)

## 2024-09-10 PROCEDURE — 12004 RPR S/N/AX/GEN/TRK7.6-12.5CM: CPT

## 2024-09-10 PROCEDURE — 99283 EMERGENCY DEPT VISIT LOW MDM: CPT | Mod: 25

## 2024-09-10 RX ORDER — IBUPROFEN 400 MG/1
400 TABLET ORAL ONCE
Status: COMPLETED | OUTPATIENT
Start: 2024-09-10 | End: 2024-09-10

## 2024-09-10 RX ORDER — ACETAMINOPHEN 325 MG/1
975 TABLET ORAL ONCE
Status: COMPLETED | OUTPATIENT
Start: 2024-09-10 | End: 2024-09-10

## 2024-09-10 RX ORDER — CEPHALEXIN 500 MG/1
500 CAPSULE ORAL 2 TIMES DAILY
Qty: 14 CAPSULE | Refills: 0 | Status: SHIPPED | OUTPATIENT
Start: 2024-09-10 | End: 2024-09-17

## 2024-09-10 RX ORDER — CEPHALEXIN 500 MG/1
500 CAPSULE ORAL ONCE
Status: COMPLETED | OUTPATIENT
Start: 2024-09-10 | End: 2024-09-10

## 2024-09-10 RX ORDER — NAPROXEN 500 MG/1
500 TABLET ORAL
Qty: 14 TABLET | Refills: 0 | Status: SHIPPED | OUTPATIENT
Start: 2024-09-10 | End: 2024-09-17

## 2024-09-10 RX ORDER — LIDOCAINE HYDROCHLORIDE 10 MG/ML
10 INJECTION, SOLUTION INFILTRATION; PERINEURAL ONCE
Status: COMPLETED | OUTPATIENT
Start: 2024-09-10 | End: 2024-09-10

## 2024-09-10 ASSESSMENT — COLUMBIA-SUICIDE SEVERITY RATING SCALE - C-SSRS
1. IN THE PAST MONTH, HAVE YOU WISHED YOU WERE DEAD OR WISHED YOU COULD GO TO SLEEP AND NOT WAKE UP?: NO
2. HAVE YOU ACTUALLY HAD ANY THOUGHTS OF KILLING YOURSELF?: NO
6. HAVE YOU EVER DONE ANYTHING, STARTED TO DO ANYTHING, OR PREPARED TO DO ANYTHING TO END YOUR LIFE?: NO

## 2024-09-10 ASSESSMENT — PAIN DESCRIPTION - PROGRESSION: CLINICAL_PROGRESSION: NOT CHANGED

## 2024-09-10 ASSESSMENT — PAIN - FUNCTIONAL ASSESSMENT: PAIN_FUNCTIONAL_ASSESSMENT: 0-10

## 2024-09-10 ASSESSMENT — PAIN DESCRIPTION - FREQUENCY: FREQUENCY: CONSTANT/CONTINUOUS

## 2024-09-10 ASSESSMENT — PAIN DESCRIPTION - PAIN TYPE: TYPE: ACUTE PAIN

## 2024-09-10 ASSESSMENT — PAIN DESCRIPTION - DESCRIPTORS: DESCRIPTORS: ACHING

## 2024-09-10 ASSESSMENT — PAIN SCALES - GENERAL: PAINLEVEL_OUTOF10: 6

## 2024-09-10 ASSESSMENT — PAIN DESCRIPTION - ONSET: ONSET: SUDDEN

## 2024-09-10 ASSESSMENT — PAIN DESCRIPTION - LOCATION: LOCATION: FINGER (COMMENT WHICH ONE)

## 2024-09-10 NOTE — ED TRIAGE NOTES
Patient presents to ED for laceration to left thumb with a 2in chisel while working. Patient is under the care of a doctor and he states his heart rate is always above 100 and he is trying to get his BP under control.

## 2024-09-10 NOTE — ED PROVIDER NOTES
HPI   Chief Complaint   Patient presents with    Finger Laceration       HPI  HPI: This is a 59 y.o. male who presents to the ER complaining of a laceration to the left thumb.  Patient states he was using a too much chisel while working, slipped and accidentally cut his left hand.  He states he had significant bleeding after the event.  Irrigated and cleansed at work prior to coming to the ED.  He denies numbness, tingling, or weakness of the thumb or the remainder of the digits.  Denies any other wounds.  Denies any history of diabetes or immunocompromised status.  Unsure of his last tetanus booster.    ROS:  General: No decreased responsiveness, no fever, chills  Neuro: no lightheadedness or dizziness, no numbness or tingling  ENMT: No nosebleed  Eyes: No discharge or redness  Skel: No joint pain  Cardiac: No chest pain   Resp: No shortness of breath  Skin: no rash + left thumb laceration    PMH: HTN, HLD    Physical Exam:  General: Vital signs stable, Pt is alert, no acute distress  Eyes: Conjunctiva normal, PERRL, EOMs intact  HENMT: Normocephalic, atraumatic,  Moist mucous membranes.   Resp: Respiratory effort is normal,  Skin: Warm and dry. +large 8cm laceration on left thenar eminence and into lateral thumb, gaping up to 2.5 cm, relatively deep, but does not appear to penetrate into the musculature, +small arteriole with pulsatile bleeding in middle of laceration, active oozing of blood.  No obvious foreign bodies, examined through entire depth of wound with no foreign bodies visualized.  Has full strength with flexion and extension of the IP and MCP joints of the thumb. Was examined through full range of motion with no obvious tendon injury.  Is neurovascularly intact distally, sensation intact and equal throughout the thumb, the hand, and all digits, warm well-perfused, capillary fill less than 2 seconds in all digits.  Skel: full range of motion of upper and lower extremities.   Neuro: Normal gait, CN  II-XII intact, no motor or sensory changes  Psych: Alert and oriented ×3, judgment is appropriate, normal mood and affect   Patient History   Past Medical History:   Diagnosis Date    Encounter for immunization 07/08/2022    Encounter for immunization    Encounter for screening for malignant neoplasm of colon 07/08/2022    Screen for colon cancer    Encounter for screening for malignant neoplasm of prostate 07/08/2022    Screening PSA (prostate specific antigen)    Persons encountering health services in other specified circumstances 07/08/2022    Encounter to establish care     Past Surgical History:   Procedure Laterality Date    OTHER SURGICAL HISTORY  01/04/2021    Leg surgery     Family History   Problem Relation Name Age of Onset    Heart failure Father      Heart attack Father      Lung cancer Maternal Grandfather       Social History     Tobacco Use    Smoking status: Former     Types: Cigarettes    Smokeless tobacco: Never   Vaping Use    Vaping status: Former   Substance Use Topics    Alcohol use: Yes     Comment: daily    Drug use: Never       Physical Exam   ED Triage Vitals [09/10/24 1644]   Temperature Heart Rate Respirations BP   37.1 °C (98.7 °F) (!) 117 18 (!) 162/107      Pulse Ox Temp Source Heart Rate Source Patient Position   97 % Temporal Monitor Sitting      BP Location FiO2 (%)     Right arm --       Physical Exam      ED Course & MDM   Diagnoses as of 09/10/24 1839   Laceration of left hand without foreign body, initial encounter   Medical Decision Making  Differential Diagnosis: Is extensive but includes superficial/deep laceration, retained foreign body, tendon injury, fracture, dislocation, etc.    Summary:   Patient presents following sustaining a laceration to the left thumb.  Tachycardic in triage at 117, has history of tachycardia.  Overall very well-appearing, ambulates unassisted, no acute distress.  On exam, there is a large 8 cm laceration over the left thenar eminence and until  the left thumb, gaping, pulsatile bleeding from a very small artery present initially, no muscle laceration, has full strength with flexion and extension of all joints of the digit, is neurovascularly intact distally.  No evidence of significant tendon or vascular injury. Patient seen and discussed with Dr. Lawton, who also evaluated the patient, and placed 3 deep sutures to control pulsatile bleeding.  After deep sutures placed, no further pulsatile bleeding.  Wound was thoroughly and extensively irrigated and cleansed.  Was closed by suture, see procedure note for details.  Patient was given a tetanus booster in the ED.  He was also given a dose of Keflex and prescription was sent to his pharmacy.  He was given a brace for comfort as well.  Advised to follow-up with orthopedic hand surgery, given referral information in order placed for close follow-up.  He was also advised to follow-up with his PCP for wound check within the next week.  Discussed wound care instructions at length.  Patient is eager for discharge.  Stable for discharge with close outpatient follow-up.    I discussed with the patient the importance of follow up for his/her wound.  I instructed the patient to keep the wound clean and dry, not to get it wet for 24 hours, and not to soak it under water until sutures/glue/staples removed or dissolve.  I also shared tips to reduce scarring, including applying antibiotic ointment multiple times per day, avoiding direct sun exposure to healing wound, and applying Mederma after sutures/glue/staples are removed or dissolve. Signs of infection were given, as well as reasons to return to the ED.  Patient was given strict return precautions, understands reasons to return to the ED. Also discussed supportive care instructions. I expressed the importance of outpatient follow up with their PCP. All questions were answered, patient expressed understanding and stated that they would comply.    Patient was  advised to follow up with PCP or recommended provider in 2-3 days for another evaluation and exam. I advised patient and family/friend/caregiver/guardian to return or go to closest emergency room immediately if symptoms change, get worse, new symptoms develop prior to follow up. If there is no improvement in symptoms in the next 24 hours they are advised to return for further evaluation and exam. I also explained the plan and treatment course. Patient and family/friend/caregiver/guardian is in agreement with plan, treatment course, and follow up and states verbally that they will comply.    Impression:  1. See diagnosis    Plan: Homegoing. I discussed the differential, results, and discharge plan with the patient and family/friend/caregiver. I emphasized the importance of follow-up with the physician I referred them to in the timeframe recommended.  I explained reasons for the patient to return to the Emergency Department. They agreed that if they feel their condition is worsening or if they have any other concern they should call 911 immediately for further assistance. We also discussed medications that were prescribed including common side effects and interactions. The patient was advised to abstain from driving, operating heavy machinery, or making significant decisions while taking medications such as opiates and muscle relaxers that may impair this. I gave the patient an opportunity to ask all questions they had and answered all of them accordingly. They understand return precautions and discharge instructions. The patient and family/friend/caregiver expressed understanding verbally and that they would comply.       Disposition: Discharge    This note has been transcribed using voice recognition and may contain grammatical errors, misplaced words, incorrect words, incorrect phrases or other errors.   Procedure  Laceration Repair    Performed by: Nicky Paulino PA-C  Authorized by: Ozzy Lawton MD     Consent:     Consent obtained:  Verbal    Consent given by:  Patient    Risks, benefits, and alternatives were discussed: yes      Risks discussed:  Infection, pain, need for additional repair, retained foreign body, poor cosmetic result and poor wound healing    Alternatives discussed:  No treatment and delayed treatment  Universal protocol:     Procedure explained and questions answered to patient or proxy's satisfaction: yes      Relevant documents present and verified: yes      Patient identity confirmed:  Verbally with patient  Anesthesia:     Anesthesia method:  Local infiltration    Local anesthetic:  Lidocaine 1% w/o epi  Laceration details:     Location:  Hand    Hand location:  L palm    Length (cm):  8  Pre-procedure details:     Preparation:  Patient was prepped and draped in usual sterile fashion  Exploration:     Limited defect created (wound extended): no      Hemostasis achieved with:  Tied off vessels (3 deep absorbable sutures places to control bleeding)    Wound exploration: wound explored through full range of motion and entire depth of wound visualized      Wound extent: no foreign bodies/material noted, no muscle damage noted, no nerve damage noted and no tendon damage noted      Contaminated: no    Treatment:     Area cleansed with:  Chlorhexidine and saline    Amount of cleaning:  Extensive    Irrigation solution:  Sterile saline    Irrigation method:  Syringe    Layers/structures repaired:  Deep dermal/superficial fascia  Deep dermal/superficial fascia:     Suture size:  4-0    Suture material:  Vicryl    Suture technique:  Simple interrupted    Number of sutures:  3  Skin repair:     Repair method:  Sutures    Suture size:  3-0 and 4-0    Suture material:  Nylon    Suture technique:  Simple interrupted    Number of sutures:  17  Approximation:     Approximation:  Close  Repair type:     Repair type:  Complex  Post-procedure details:     Dressing:  Open (no dressing)    Procedure  completion:  Tolerated well, no immediate complications       Nicky Paulino PA-C  09/10/24 [id number]

## 2024-09-10 NOTE — Clinical Note
Portillo Schwartz was seen and treated in our emergency department on 9/10/2024.  He may return to work on 09/12/2024.       If you have any questions or concerns, please don't hesitate to call.      Nicky Paulino PA-C

## 2024-09-17 ENCOUNTER — TELEPHONE (OUTPATIENT)
Dept: PRIMARY CARE | Facility: CLINIC | Age: 59
End: 2024-09-17
Payer: COMMERCIAL

## 2024-09-25 ENCOUNTER — TELEPHONE (OUTPATIENT)
Dept: PRIMARY CARE | Facility: CLINIC | Age: 59
End: 2024-09-25

## 2024-09-25 ENCOUNTER — APPOINTMENT (OUTPATIENT)
Dept: PRIMARY CARE | Facility: CLINIC | Age: 59
End: 2024-09-25
Payer: COMMERCIAL

## 2024-09-25 ENCOUNTER — OFFICE VISIT (OUTPATIENT)
Dept: PRIMARY CARE | Facility: CLINIC | Age: 59
End: 2024-09-25
Payer: COMMERCIAL

## 2024-09-25 VITALS
BODY MASS INDEX: 29.8 KG/M2 | WEIGHT: 220 LBS | RESPIRATION RATE: 14 BRPM | HEIGHT: 72 IN | TEMPERATURE: 97.2 F | DIASTOLIC BLOOD PRESSURE: 78 MMHG | SYSTOLIC BLOOD PRESSURE: 146 MMHG | HEART RATE: 117 BPM | OXYGEN SATURATION: 97 %

## 2024-09-25 DIAGNOSIS — Z48.02 VISIT FOR SUTURE REMOVAL: Primary | ICD-10-CM

## 2024-09-25 PROCEDURE — 1036F TOBACCO NON-USER: CPT | Performed by: NURSE PRACTITIONER

## 2024-09-25 PROCEDURE — 3008F BODY MASS INDEX DOCD: CPT | Performed by: NURSE PRACTITIONER

## 2024-09-25 PROCEDURE — S0630 REMOVAL OF SUTURES: HCPCS | Performed by: NURSE PRACTITIONER

## 2024-09-25 ASSESSMENT — PATIENT HEALTH QUESTIONNAIRE - PHQ9
SUM OF ALL RESPONSES TO PHQ9 QUESTIONS 1 AND 2: 2
1. LITTLE INTEREST OR PLEASURE IN DOING THINGS: SEVERAL DAYS
10. IF YOU CHECKED OFF ANY PROBLEMS, HOW DIFFICULT HAVE THESE PROBLEMS MADE IT FOR YOU TO DO YOUR WORK, TAKE CARE OF THINGS AT HOME, OR GET ALONG WITH OTHER PEOPLE: VERY DIFFICULT
2. FEELING DOWN, DEPRESSED OR HOPELESS: SEVERAL DAYS

## 2024-09-25 NOTE — PROGRESS NOTES
Subjective   Patient ID: Portillo Schwartz is a 59 y.o. male who presents for No chief complaint on file..  HPI    Review of Systems  Constitutional:  no chills, no fever and no night sweats.  Eyes: no blurred vision and no eyesight problems.  ENT: no hearing loss, no nasal congestion, no hoarseness and no sore throat.  Neck: no mass (es) and no swelling.  Cardiovascular: no chest pain, no intermittent leg claudication, no lower extremity edema, no palpitation and no syncope.  Respiratory: no cough, no shortness of breath during exertion, no shortness of breath at rest and no wheezing.  Gastrointestinal: no abdominal pain, no blood in stools, no constipation, no diarrhea, no melena, no nausea, no rectal pain and no vomiting.  Genitourinary: no dysuria, no change in urinary frequency, no urinary hesitancy and no feelings of urinary urgency.  Musculoskeletal: no arthralgias, no back pain and no myalgias.  Integumentary: no new skin lesions and no rashes.  Neurological: no difficulty walking, no headache, no limb weakness, no numbness and no tingling.  Psychiatric/Behavioral: no anxiety, no depression, no anhedonia and no substance use disorders.  Endocrine: no recent weight gain and no recent weight loss.  Hematologic/Lymphatic: no tendency for easy bruising and no swollen glands    Objective   Physical Exam    There were no vitals taken for this visit.    Lab Results   Component Value Date    WBC 5.5 06/29/2024    HGB 15.7 06/29/2024    HCT 45.9 06/29/2024    MCV 91 06/29/2024     (L) 06/29/2024       Assessment/Plan   Problem List Items Addressed This Visit    None

## 2024-09-25 NOTE — LETTER
September 24, 2024     Patient: Portillo Schwartz   YOB: 1965   Date of Visit: 9/25/2024       To Whom It May Concern:    Portillo Schwartz was seen in my office on 9/25/2024 at . Please excuse Portillo from work 09/24/2024 to 09/27/2024. May return on 09/30/2024    If you have any questions or concerns, please don't hesitate to call.         Sincerely,         SASKIA Uriostegui-CNP, DNP

## 2024-09-25 NOTE — LETTER
September 24, 2024    Patient: Portillo Schwartz   YOB: 1965   Date of Visit: 9/25/2024       To Whom It May Concern:    Portillo Schwartz was seen in my clinic on 9/25/2024 for an appointment.  Please excuse from work 9/25/2024 to 9/27/2024.  May return to work on 9/30/2024.    If you have any questions or concerns, please don't hesitate to call.         Sincerely,         Lupe Gautam, CNP

## 2024-09-25 NOTE — PROGRESS NOTES
Subjective   Patient ID: Portillo Schwartz is a 59 y.o. male who presents for Suture / Staple Removal.      HPI    Patient presents today for stitch removal.  He had 17 sutures placed in his left hand.  He did receive his tetanus shot. Patient states that stitches have been in for about two weeks.    Patient declines flu shot.      The patient's allergies, medications, and past medical/surgical/family history were reviewed with them today and updated as indicated.    Review of Systems    Review of Systems   Objective   Vital Signs  /78 (BP Location: Left arm, Patient Position: Sitting)   Pulse (!) 117   Temp 36.2 °C (97.2 °F) (Temporal)   Resp 14   Ht 1.829 m (6')   Wt 99.8 kg (220 lb)   SpO2 97%   BMI 29.84 kg/m²     Physical Exam  Vitals and nursing note reviewed.   Constitutional:       General: He is not in acute distress.     Appearance: Normal appearance. He is not ill-appearing.   HENT:      Head: Normocephalic and atraumatic.      Right Ear: External ear normal.      Left Ear: External ear normal.      Mouth/Throat:      Mouth: Mucous membranes are moist.   Eyes:      Extraocular Movements: Extraocular movements intact.      Conjunctiva/sclera: Conjunctivae normal.   Cardiovascular:      Rate and Rhythm: Normal rate and regular rhythm.      Heart sounds: No murmur heard.  Pulmonary:      Effort: Pulmonary effort is normal. No respiratory distress.   Skin:     General: Skin is warm and dry.      Findings: Lesion (left thumb wound) present. No erythema or rash.   Neurological:      General: No focal deficit present.      Mental Status: He is alert. Mental status is at baseline.   Psychiatric:         Mood and Affect: Mood normal.         Behavior: Behavior normal.         Thought Content: Thought content normal.      Assessment & Plan   No Known Allergies    Suture Removal    Date/Time: 9/25/2024 3:42 PM    Performed by: JORDY Uriostegui DNP  Authorized by: JORDY Uriostegui DNP     Consent:     Consent obtained:  Verbal    Consent given by:  Patient    Risks, benefits, and alternatives were discussed: yes      Risks discussed:  Bleeding, pain and wound separation  Universal protocol:     Procedure explained and questions answered to patient or proxy's satisfaction: yes      Patient identity confirmed:  Verbally with patient  Location:     Location:  Upper extremity    Upper extremity location:  Hand    Hand location:  L thumb  Procedure details:     Wound appearance:  No signs of infection, good wound healing, nontender and clean    Number of sutures removed:  17  Post-procedure details:     Post-removal:  Antibiotic ointment applied    Procedure completion:  Tolerated well, no immediate complications    Problem List Items Addressed This Visit    None  Visit Diagnoses       Visit for suture removal   (Acute)  -  Primary    See procedure note above.          Reviewed treatment options and health maintenance. Take medications as prescribed. We will contact you with the results of any ordered testing; please send a [email or web address] message or call the office if you do not hear from us. Follow up in the office as discussed. Return sooner if symptoms do not resolve as expected.     Lupe Gautam, APRN-CNP, DNP, CCRN  Family Nurse Practitioner  [facility]

## 2025-08-06 DIAGNOSIS — Z12.12 SCREENING FOR COLORECTAL CANCER: ICD-10-CM

## 2025-08-06 DIAGNOSIS — Z12.11 SCREENING FOR COLORECTAL CANCER: ICD-10-CM
